# Patient Record
(demographics unavailable — no encounter records)

---

## 2019-03-10 NOTE — DIAGNOSTIC IMAGING REPORT
EXAM:  ABDOMEN-1VIEW (KUB)

DATE: 3/10/2019 9:11 PM

INDICATION: Altered mental status. Constipation.

COMPARISON: None



FINDINGS:

Motion artifacts limit evaluation.



LINES/TUBES: None



BOWEL PATTERN: No dilated loops of small bowel. The rectum is distended to

approximately 9.2 cm in diameter with stool.



SOFT TISSUES: No abnormal calcifications. No mass effect.



LUNG BASES: Limited evaluation.



BONES: No apparent displaced fractures.



IMPRESSION:

Limited exam secondary to motion.



Large amount of stool in the rectum which is distended to approximately 9.2 cm

in diameter. Consider disimpaction.



Signed by: DR. Baron Horton MD on 3/10/2019 10:51 PM

## 2019-03-10 NOTE — NUR
rec'd pt. in rm 6 via [facility] from home for constipation and decreased 
appetite.  pt placed on the monitor and in a gown.  unable to follow 
directions.  siderails up x 2 and bed low/locked.